# Patient Record
Sex: MALE | Race: WHITE | NOT HISPANIC OR LATINO | Employment: FULL TIME | URBAN - METROPOLITAN AREA
[De-identification: names, ages, dates, MRNs, and addresses within clinical notes are randomized per-mention and may not be internally consistent; named-entity substitution may affect disease eponyms.]

---

## 2021-08-10 ENCOUNTER — APPOINTMENT (OUTPATIENT)
Dept: GENERAL RADIOLOGY | Facility: HOSPITAL | Age: 26
End: 2021-08-10

## 2021-08-10 ENCOUNTER — HOSPITAL ENCOUNTER (EMERGENCY)
Facility: HOSPITAL | Age: 26
Discharge: HOME | End: 2021-08-10
Attending: EMERGENCY MEDICINE | Admitting: EMERGENCY MEDICINE

## 2021-08-10 VITALS
RESPIRATION RATE: 16 BRPM | TEMPERATURE: 98.6 F | WEIGHT: 290.13 LBS | SYSTOLIC BLOOD PRESSURE: 155 MMHG | HEIGHT: 73 IN | BODY MASS INDEX: 38.45 KG/M2 | HEART RATE: 78 BPM | DIASTOLIC BLOOD PRESSURE: 89 MMHG | OXYGEN SATURATION: 99 %

## 2021-08-10 DIAGNOSIS — M54.42 ACUTE MIDLINE LOW BACK PAIN WITH LEFT-SIDED SCIATICA: Primary | ICD-10-CM

## 2021-08-10 PROCEDURE — 72100 X-RAY EXAM L-S SPINE 2/3 VWS: CPT

## 2021-08-10 PROCEDURE — 99282 EMERGENCY DEPT VISIT SF MDM: CPT

## 2021-08-10 RX ORDER — METHOCARBAMOL 500 MG/1
500 TABLET, FILM COATED ORAL 4 TIMES DAILY PRN
Qty: 20 TABLET | Refills: 0 | Status: SHIPPED | OUTPATIENT
Start: 2021-08-10

## 2021-08-10 RX ORDER — METHYLPREDNISOLONE 4 MG/1
TABLET ORAL
Qty: 21 EACH | Refills: 0 | Status: SHIPPED | OUTPATIENT
Start: 2021-08-10

## 2021-08-10 NOTE — ED PROVIDER NOTES
Emergency Department Encounter    Room number: 57/57  Date seen: 8/10/2021  PCP: Joyce Roldan APRN        History provided by:  Caregiver   used: No          HPI:  Chief complaint: low back pain    Context: Wilber Méndez is a 26 y.o. male with a history of hypertension who presents to the ED with low back pain which radiates down the left leg since Friday (4 days).  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  Patient denies fever, cough, chest pain, shortness breath, dull pain, dysuria, hematuria, bloody stool, loss of bowel or bladder control, rectal numbness, or other complaint.  Patient states movement increases pain.  Patient smokes half pack a day.  Patient denies alcohol use.      Location: low back pain  Quality: sharp  Severity: moderate  Radiation: down left leg  Duration: constant  Onset: 4 days  Modifying factors: works on assembly line        Old records reviewed:  No ED visits in the last year.       Triage Vitals:  ED Triage Vitals [08/10/21 1614]   Temp Heart Rate Resp BP SpO2   98.6 °F (37 °C) 78 16 155/89 99 %      Temp src Heart Rate Source Patient Position BP Location FiO2 (%)   Oral Monitor Standing Left arm --         Review of Systems   Constitutional: Negative for chills and fever.   Respiratory: Negative for cough and shortness of breath.    Cardiovascular: Negative for chest pain and palpitations.   Gastrointestinal: Negative for abdominal pain, nausea and vomiting.   Musculoskeletal: Negative for back pain and myalgias.   Skin: Negative for rash and wound.   Neurological: Negative for dizziness and headaches.         Physical Exam  Constitutional:       Appearance: Normal appearance.   HENT:      Head: Normocephalic and atraumatic.   Cardiovascular:      Rate and Rhythm: Normal rate and regular rhythm.   Pulmonary:      Effort: Pulmonary effort is normal.      Breath sounds: Normal breath sounds.   Abdominal:      General: Bowel sounds are normal.      Palpations:  Abdomen is soft.   Musculoskeletal:         General: Normal range of motion.      Comments: Positive midline lumbar and left sacral groove tenderness with palpation.  Positive straight leg raise on the left at 30 degrees.  Negative straight leg raise on the right.  Bilateral lower extremity strength equal and strong.  Patellar reflexes are 2+ and equal bilaterally.  Bilateral lower extremities warm and well perfused.   Skin:     General: Skin is warm.   Neurological:      General: No focal deficit present.      Mental Status: He is alert and oriented to person, place, and time.             Allergies:  Patient has no known allergies.  Patient's allergies reviewed    Past Medical History:  Past Medical History:   Diagnosis Date   • Anxiety    • Hypertension          Past Surgical History:  Past Surgical History:   Procedure Laterality Date   • ADENOIDECTOMY     • TONSILLECTOMY         Procedures    Labs Reviewed - No data to display    XR Spine Lumbar AP & Lateral    Result Date: 8/10/2021  Narrative: PROCEDURE: XR SPINE LUMBAR AP AND LATERAL  COMPARISON: None  INDICATIONS: low back pain worsening  FINDINGS:  There is normal height and alignment of the lumbar vertebral bodies.  The intervertebral disc spaces are within normal limits.  No acute fracture identified.  Sacroiliac joints appear within normal limits.  CONCLUSION:  1. No acute bony abnormality or significant degenerative change of the lumbar spine.      RENATO BATISTA MD       Electronically Signed and Approved By: RENATO BATISTA MD on 8/10/2021 at 18:14               Progress Notes:  1900 Patient rechecked I have personally reviewed all radiology findings, incidental and otherwise, with the patient and made patient aware of what needs to be followed up with PCP.    1900 I have discussed with the patient the emergency department work-up including all test results, the differential diagnosis, the diagnosis given in the emergency department, and the absolute  need for follow-up with the primary care physician.  I have discussed all prescriptions and treatments.  I have discussed the possible worsening of their condition, and also discussed criteria for return to the emergency department which includes but is not limited to worsening condition or lack of improvement of the current condition.  I have informed them that a normal work-up evaluation in the emergency department and/or discharge from the emergency department, does not signify that no disease process is present, but simply that there is no emergent indication for admission.  All questions were answered at this time.  I informed them that significant pathology may still be present, but they may need further work-up, and that this further investigation should be undertaken by the primary care physician. He voiced his/her understanding.  Patient is agreeable to plan for discharge.    Discharge instructions include:  Follow-up with your PCP, Yuli PARKER for reevaluation recheck her blood pressure.    Follow-up with Dr. Rick Aragon, neurosurgery for further evaluation.  Call for an appointment.    Take prednisone and Robaxin as directed.    Use ice to areas of pain 20 minutes 4 times daily until follow-up.    Avoid heavy lifting bending and stooping.  Use good body mechanics at your job.     Return to the emergency department for fever, loss of bowel or bladder control, weakness in lower extremities, pain or difficulty with urination, difficulty walking, new change worsening symptoms.          Final diagnoses:   Acute midline low back pain with left-sided sciatica       Prescriptions:        Medication List      START taking these medications    methocarbamol 500 MG tablet  Commonly known as: ROBAXIN  Take 1 tablet by mouth 4 (Four) Times a Day As Needed for Muscle Spasms.     methylPREDNISolone 4 MG dose pack  Commonly known as: MEDROL  Take as directed on package instructions.        CONTINUE taking these  medications    CELEXA PO     LISINOPRIL PO           Where to Get Your Medications      These medications were sent to [facility] [place] - [address] [facility] [address] - [phone]  - [phone]   [address] [address], [place] [address]    Phone: [phone]   · methocarbamol 500 MG tablet  · methylPREDNISolone 4 MG dose pack                MDM  Number of Diagnoses or Management Options     Amount and/or Complexity of Data Reviewed  Tests in the radiology section of CPT®: ordered  Review and summarize past medical records: yes  Independent visualization of images, tracings, or specimens: yes    Risk of Complications, Morbidity, and/or Mortality  Presenting problems: moderate  Diagnostic procedures: low  Management options: low    Patient Progress  Patient progress: stable      (M54.42) Acute midline low back pain with left-sided sciatica      Reviewing your test results and medical records in your chart is not a substitution for discussing these with your health care provider.  Please contact your primary care provider to discuss any questions or concerns you may have regarding these test results.      Part of this note may be an electronic transcription/translation of spoken language to printed text using the Dragon Dictation System.  The electronic translation of spoken language may permit erroneous or at times nonsensical words or phrases to be inadvertently transcribed.  Although I have reviewed the note for such errors some may still exist.             Nina Blake, KEITH  08/10/21 1905       Nina Blake, KEITH  08/10/21 1908

## 2021-08-10 NOTE — DISCHARGE INSTRUCTIONS
Follow-up with your PCP, Yuli PARKER for reevaluation recheck her blood pressure.    Follow-up with Dr. Rick Aragon, neurosurgery for further evaluation.  Call for an appointment.    Take prednisone and Robaxin as directed.    Use ice to areas of pain 20 minutes 4 times daily until follow-up.    Avoid heavy lifting bending and stooping.  Use good body mechanics at your job.     Return to the emergency department for fever, loss of bowel or bladder control, weakness in lower extremities, pain or difficulty with urination, difficulty walking, new change worsening symptoms.